# Patient Record
Sex: FEMALE | Race: WHITE | NOT HISPANIC OR LATINO | Employment: UNEMPLOYED | URBAN - METROPOLITAN AREA
[De-identification: names, ages, dates, MRNs, and addresses within clinical notes are randomized per-mention and may not be internally consistent; named-entity substitution may affect disease eponyms.]

---

## 2020-04-08 ENCOUNTER — TELEPHONE (OUTPATIENT)
Dept: PEDIATRICS | Facility: CLINIC | Age: 30
End: 2020-04-08

## 2020-04-08 NOTE — TELEPHONE ENCOUNTER
Spoke with patient's mom. She was looking for an appointment with Dr Blanc for her 5 year old and her new baby that hasn't been born yet. Told mom that after the baby is born before he/she is discharged they will be given an appointment. Her 5 year old was never seen before and I told her I will transfer her to the phone desk so that they can create a chart for her. Explained that if its for a well visit, she will have to be seen at a later date. Mom verbalized understanding.

## 2020-04-08 NOTE — TELEPHONE ENCOUNTER
----- Message from Martina Avery sent at 4/8/2020  3:49 PM CDT -----  Contact: MOTHER KAEL  CALLING CONCERNING GETTING HER 5 YR OLD AND HAVING ANOTHER BABY SOON IN  AS NEW PATIENTS WITH PROVIDER. PLEASE CALL MOTHER ASAP IF PROVIDER WILL TAKE THEM AS A NEW PATIENT @ [phone]. THANKS,

## 2021-05-06 ENCOUNTER — PATIENT MESSAGE (OUTPATIENT)
Dept: RESEARCH | Facility: HOSPITAL | Age: 31
End: 2021-05-06